# Patient Record
Sex: FEMALE | Race: WHITE | NOT HISPANIC OR LATINO | Employment: OTHER | URBAN - NONMETROPOLITAN AREA
[De-identification: names, ages, dates, MRNs, and addresses within clinical notes are randomized per-mention and may not be internally consistent; named-entity substitution may affect disease eponyms.]

---

## 2017-03-14 ENCOUNTER — CLINICAL SUPPORT (OUTPATIENT)
Dept: CARDIOLOGY | Facility: CLINIC | Age: 72
End: 2017-03-14

## 2017-03-14 DIAGNOSIS — I49.5 SICK SINUS SYNDROME (HCC): ICD-10-CM

## 2017-03-14 DIAGNOSIS — Z95.0 PACEMAKER: ICD-10-CM

## 2017-03-14 PROCEDURE — 93288 INTERROG EVL PM/LDLS PM IP: CPT | Performed by: NURSE PRACTITIONER

## 2017-03-15 NOTE — PROGRESS NOTES
Pacemaker Evaluation Report    March 14, 2017    Indication for pacemaker: sinus node dysfunction - chronotropic incompetence    Implanting MD: Dr. Andrew Christianson    : [employer] Model: Altrua 60    Implant Date: 7/9/2012    Reason For Evaluation: routine      DIAGNOSTIC DATA  Dual chamber pacemaker but atrial lead turned off due to lead dislodgement - pt has declined repositioning. Lower rate set at 40. Programmed VVI    Atrial paced: 0% Ventricular paced: 4%    Battery status: satisfactory   Estimated battery life: greater than 5 years      FINAL PARAMETERS    Changes made: none    Conclusions: stable pacemaker function with stable ventricular lead sensing and thresholds    Return in about 1 year (around 3/14/2018) for Pacemaker Clinic.        Charlotte Murphy, APRN, ACNP-BC, CHFN-BC

## 2019-03-11 ENCOUNTER — CLINICAL SUPPORT (OUTPATIENT)
Dept: CARDIOLOGY | Facility: CLINIC | Age: 74
End: 2019-03-11

## 2019-03-11 DIAGNOSIS — I49.5 SICK SINUS SYNDROME (HCC): ICD-10-CM

## 2019-03-11 DIAGNOSIS — Z95.0 PACEMAKER: ICD-10-CM

## 2019-03-11 PROCEDURE — 93288 INTERROG EVL PM/LDLS PM IP: CPT | Performed by: NURSE PRACTITIONER

## 2019-03-11 NOTE — PROGRESS NOTES
Pacemaker Evaluation Report    March 11, 2019    Indication for pacemaker: sinus node dysfunction - chronotropic incompetence    Implanting MD: Dr. Andrew Christianson    : [place] Model: Altrua 60    Implant Date: 7/9/2012    Reason For Evaluation: routine      DIAGNOSTIC DATA  Dual chamber pacemaker but atrial lead turned off due to lead dislodgement - pt has declined repositioning. Lower rate set at 40. Programmed VVI    Atrial paced: 0%  Ventricular paced: 2%    Battery status: satisfactory   Estimated battery life: 3.5 years      FINAL PARAMETERS    Changes made: none    Conclusions: stable pacemaker function with stable ventricular lead sensing and thresholds    Return in about 1 year (around 3/11/2020) for Pacemaker Clinic ([facility]).        Charlotte Murphy, APRN, ACNP-BC, CHFN-BC